# Patient Record
Sex: MALE | Race: WHITE | NOT HISPANIC OR LATINO | Employment: OTHER | URBAN - METROPOLITAN AREA
[De-identification: names, ages, dates, MRNs, and addresses within clinical notes are randomized per-mention and may not be internally consistent; named-entity substitution may affect disease eponyms.]

---

## 2021-10-28 ENCOUNTER — HOSPITAL ENCOUNTER (EMERGENCY)
Facility: HOSPITAL | Age: 51
Discharge: HOME | End: 2021-10-28
Attending: EMERGENCY MEDICINE | Admitting: EMERGENCY MEDICINE
Payer: COMMERCIAL

## 2021-10-28 ENCOUNTER — APPOINTMENT (OUTPATIENT)
Dept: CT IMAGING | Facility: HOSPITAL | Age: 51
End: 2021-10-28
Attending: EMERGENCY MEDICINE
Payer: COMMERCIAL

## 2021-10-28 ENCOUNTER — NURSE TRIAGE (OUTPATIENT)
Dept: NURSING | Facility: CLINIC | Age: 51
End: 2021-10-28

## 2021-10-28 ENCOUNTER — APPOINTMENT (OUTPATIENT)
Dept: RADIOLOGY | Facility: HOSPITAL | Age: 51
End: 2021-10-28
Attending: STUDENT IN AN ORGANIZED HEALTH CARE EDUCATION/TRAINING PROGRAM
Payer: COMMERCIAL

## 2021-10-28 VITALS
WEIGHT: 185 LBS | BODY MASS INDEX: 28.04 KG/M2 | DIASTOLIC BLOOD PRESSURE: 81 MMHG | HEART RATE: 93 BPM | SYSTOLIC BLOOD PRESSURE: 125 MMHG | OXYGEN SATURATION: 92 % | HEIGHT: 68 IN | RESPIRATION RATE: 22 BRPM | TEMPERATURE: 99.4 F

## 2021-10-28 DIAGNOSIS — U07.1 INFECTION DUE TO 2019 NOVEL CORONAVIRUS: ICD-10-CM

## 2021-10-28 PROBLEM — J30.2 SEASONAL ALLERGIC RHINITIS: Status: ACTIVE | Noted: 2021-10-28

## 2021-10-28 LAB
ALBUMIN SERPL-MCNC: 3.5 G/DL (ref 3.5–5)
ALP SERPL-CCNC: 43 U/L (ref 45–120)
ALT SERPL W P-5'-P-CCNC: 52 U/L (ref 0–45)
ANION GAP SERPL CALCULATED.3IONS-SCNC: 12 MMOL/L (ref 5–18)
AST SERPL W P-5'-P-CCNC: 43 U/L (ref 0–40)
BASOPHILS # BLD AUTO: 0 10E3/UL (ref 0–0.2)
BASOPHILS NFR BLD AUTO: 0 %
BILIRUB DIRECT SERPL-MCNC: 0.3 MG/DL
BILIRUB SERPL-MCNC: 0.6 MG/DL (ref 0–1)
BUN SERPL-MCNC: 12 MG/DL (ref 8–22)
C REACTIVE PROTEIN LHE: 4 MG/DL (ref 0–0.8)
CALCIUM SERPL-MCNC: 8.4 MG/DL (ref 8.5–10.5)
CHLORIDE BLD-SCNC: 105 MMOL/L (ref 98–107)
CO2 SERPL-SCNC: 19 MMOL/L (ref 22–31)
CREAT SERPL-MCNC: 0.92 MG/DL (ref 0.7–1.3)
D DIMER PPP FEU-MCNC: 0.43 UG/ML FEU (ref 0–0.5)
EOSINOPHIL # BLD AUTO: 0 10E3/UL (ref 0–0.7)
EOSINOPHIL NFR BLD AUTO: 0 %
ERYTHROCYTE [DISTWIDTH] IN BLOOD BY AUTOMATED COUNT: 12.2 % (ref 10–15)
FLUAV RNA SPEC QL NAA+PROBE: NEGATIVE
FLUBV RNA RESP QL NAA+PROBE: NEGATIVE
GFR SERPL CREATININE-BSD FRML MDRD: >90 ML/MIN/1.73M2
GLUCOSE BLD-MCNC: 99 MG/DL (ref 70–125)
HCT VFR BLD AUTO: 42.4 % (ref 40–53)
HGB BLD-MCNC: 14.8 G/DL (ref 13.3–17.7)
IMM GRANULOCYTES # BLD: 0 10E3/UL
IMM GRANULOCYTES NFR BLD: 0 %
LYMPHOCYTES # BLD AUTO: 0.9 10E3/UL (ref 0.8–5.3)
LYMPHOCYTES NFR BLD AUTO: 17 %
MCH RBC QN AUTO: 30 PG (ref 26.5–33)
MCHC RBC AUTO-ENTMCNC: 34.9 G/DL (ref 31.5–36.5)
MCV RBC AUTO: 86 FL (ref 78–100)
MONOCYTES # BLD AUTO: 0.5 10E3/UL (ref 0–1.3)
MONOCYTES NFR BLD AUTO: 10 %
NEUTROPHILS # BLD AUTO: 3.9 10E3/UL (ref 1.6–8.3)
NEUTROPHILS NFR BLD AUTO: 73 %
NRBC # BLD AUTO: 0 10E3/UL
NRBC BLD AUTO-RTO: 0 /100
PLATELET # BLD AUTO: 160 10E3/UL (ref 150–450)
POTASSIUM BLD-SCNC: 3.8 MMOL/L (ref 3.5–5)
PROT SERPL-MCNC: 7.2 G/DL (ref 6–8)
RBC # BLD AUTO: 4.94 10E6/UL (ref 4.4–5.9)
RSV RNA SPEC NAA+PROBE: NEGATIVE
SARS-COV-2 RNA RESP QL NAA+PROBE: POSITIVE
SODIUM SERPL-SCNC: 136 MMOL/L (ref 136–145)
WBC # BLD AUTO: 5.3 10E3/UL (ref 4–11)

## 2021-10-28 PROCEDURE — 86141 C-REACTIVE PROTEIN HS: CPT | Performed by: EMERGENCY MEDICINE

## 2021-10-28 PROCEDURE — 82310 ASSAY OF CALCIUM: CPT | Performed by: EMERGENCY MEDICINE

## 2021-10-28 PROCEDURE — 71046 X-RAY EXAM CHEST 2 VIEWS: CPT

## 2021-10-28 PROCEDURE — 93005 ELECTROCARDIOGRAM TRACING: CPT | Performed by: EMERGENCY MEDICINE

## 2021-10-28 PROCEDURE — 36415 COLL VENOUS BLD VENIPUNCTURE: CPT | Performed by: EMERGENCY MEDICINE

## 2021-10-28 PROCEDURE — 250N000011 HC RX IP 250 OP 636: Performed by: EMERGENCY MEDICINE

## 2021-10-28 PROCEDURE — 80053 COMPREHEN METABOLIC PANEL: CPT | Performed by: EMERGENCY MEDICINE

## 2021-10-28 PROCEDURE — 71275 CT ANGIOGRAPHY CHEST: CPT

## 2021-10-28 PROCEDURE — 250N000012 HC RX MED GY IP 250 OP 636 PS 637: Performed by: EMERGENCY MEDICINE

## 2021-10-28 PROCEDURE — C9803 HOPD COVID-19 SPEC COLLECT: HCPCS

## 2021-10-28 PROCEDURE — 99285 EMERGENCY DEPT VISIT HI MDM: CPT | Mod: 25

## 2021-10-28 PROCEDURE — 85379 FIBRIN DEGRADATION QUANT: CPT | Performed by: EMERGENCY MEDICINE

## 2021-10-28 PROCEDURE — 85025 COMPLETE CBC W/AUTO DIFF WBC: CPT | Performed by: EMERGENCY MEDICINE

## 2021-10-28 PROCEDURE — 250N000013 HC RX MED GY IP 250 OP 250 PS 637: Performed by: STUDENT IN AN ORGANIZED HEALTH CARE EDUCATION/TRAINING PROGRAM

## 2021-10-28 PROCEDURE — 87637 SARSCOV2&INF A&B&RSV AMP PRB: CPT | Performed by: EMERGENCY MEDICINE

## 2021-10-28 PROCEDURE — 82248 BILIRUBIN DIRECT: CPT | Performed by: EMERGENCY MEDICINE

## 2021-10-28 RX ORDER — DEXAMETHASONE 6 MG/1
6 TABLET ORAL DAILY
Qty: 6 TABLET | Refills: 0 | Status: SHIPPED | OUTPATIENT
Start: 2021-10-29 | End: 2021-11-04

## 2021-10-28 RX ORDER — ACETAMINOPHEN 325 MG/1
650 TABLET ORAL ONCE
Status: COMPLETED | OUTPATIENT
Start: 2021-10-28 | End: 2021-10-28

## 2021-10-28 RX ORDER — IOPAMIDOL 755 MG/ML
100 INJECTION, SOLUTION INTRAVASCULAR ONCE
Status: COMPLETED | OUTPATIENT
Start: 2021-10-28 | End: 2021-10-28

## 2021-10-28 RX ADMIN — IOPAMIDOL 100 ML: 755 INJECTION, SOLUTION INTRAVENOUS at 20:56

## 2021-10-28 RX ADMIN — DEXAMETHASONE 6 MG: 4 TABLET ORAL at 19:28

## 2021-10-28 RX ADMIN — ACETAMINOPHEN 650 MG: 325 TABLET ORAL at 16:05

## 2021-10-28 ASSESSMENT — ENCOUNTER SYMPTOMS
SHORTNESS OF BREATH: 1
FATIGUE: 1
FEVER: 1
MYALGIAS: 1
COUGH: 1

## 2021-10-28 ASSESSMENT — MIFFLIN-ST. JEOR: SCORE: 1673.65

## 2021-10-28 NOTE — ED TRIAGE NOTES
Pt here with fever and cough for the last week. Was seen at the clinic and refused a covid test at that time. He is not vaccinated, + kids at home with covid. He is anxious about having the mask on. Cough is sometimes productive. On last day of antibiotic now.

## 2021-10-28 NOTE — Clinical Note
Jamil Simms was seen and treated in our emergency department on 10/28/2021.  He may return to work on 11/07/2021.  Follow cdc guidelines/work guidelines for covid tested positive today.     If you have any questions or concerns, please don't hesitate to call.      Carmela Devine MD

## 2021-10-28 NOTE — TELEPHONE ENCOUNTER
Triage call:  Patient calling with shortness of breath, cough, fever 102 orally, fatigue, body aches.  He states he is ' short of breath with activity and at rest' and 9/10 chest pain when coughing.   He was seen on 10/25/2021 at [facility] and was prescribed Z-tami.  He reports he is on day 4 today and symptoms not improving.  He refused Covid test at that visit and reports he has had possible exposure from his children.      According to the protocol, patient should Go to ED now.  Care advice given. Patient verbalizes understanding and agrees with plan of care. Plans to call wife to drive him to ED.     Anayeli Traore RN, Nurse Advisor 2:11 PM 10/28/2021    COVID 19 Nurse Triage Plan/Patient Instructions    Please be aware that novel coronavirus (COVID-19) may be circulating in the community. If you develop symptoms such as fever, cough, or SOB or if you have concerns about the presence of another infection including coronavirus (COVID-19), please contact your health care provider or visit [email or web address].     Disposition/Instructions    ED Visit recommended. Follow protocol based instructions.     Bring Your Own Device:  Please also bring your smart device(s) (smart phones, tablets, laptops) and their charging cables for your personal use and to communicate with your care team during your visit.    Thank you for taking steps to prevent the spread of this virus.  o Limit your contact with others.  o Wear a simple mask to cover your cough.  o Wash your hands well and often.    Resources    [facility]: About COVID-19: [email or web address]    CDC: What to Do If You're Sick: www.cdc.gov/coronavirus/2019-ncov/about/steps-when-sick.html    CDC: Ending Home Isolation: www.cdc.gov/coronavirus/2019-ncov/hcp/disposition-in-home-patients.html     CDC: Caring for Someone: www.cdc.gov/coronavirus/2019-ncov/if-you-are-sick/care-for-someone.html     [facility]: Interim Guidance for Hospital Discharge to  Home: www.health.[facility]/diseases/coronavirus/hcp/hospdischarge.pdf    [facility] clinical trials (COVID-19 research studies): [email or web address]     Below are the COVID-19 hotlines at the [place] Department of Health ([facility]). Interpreters are available.   o For health questions: Call 868-699-8420 or 1-956.963.8775 (7 a.m. to 7 p.m.)  o For questions about schools and childcare: Call 243-864-0386 or 1-363.518.4989 (7 a.m. to 7 p.m.)          Reason for Disposition    MODERATE difficulty breathing (e.g., speaks in phrases, SOB even at rest, pulse 100-120)    Additional Information    Negative: SEVERE difficulty breathing (e.g., struggling for each breath, speaks in single words)    Negative: Difficult to awaken or acting confused (e.g., disoriented, slurred speech)    Negative: Bluish (or gray) lips or face now    Negative: Shock suspected (e.g., cold/pale/clammy skin, too weak to stand, low BP, rapid pulse)    Negative: Sounds like a life-threatening emergency to the triager    Negative: [1] COVID-19 exposure AND [2] no symptoms    Negative: COVID-19 vaccine reaction suspected (e.g., fever, headache, muscle aches) occurring 1 to 3 days after getting vaccine    Negative: COVID-19 vaccine, questions about    Negative: [1] Lives with someone known to have influenza (flu test positive) AND [2] flu-like symptoms (e.g., cough, runny nose, sore throat, SOB; with or without fever)    Negative: [1] Adult with possible COVID-19 symptoms AND [2] triager concerned about severity of symptoms or other causes    Negative: COVID-19 and breastfeeding, questions about    Negative: SEVERE or constant chest pain or pressure (Exception: mild central chest pain, present only when coughing)    Protocols used: CORONAVIRUS (COVID-19) DIAGNOSED OR [id number] 8.25.2021

## 2021-10-28 NOTE — ED PROVIDER NOTES
Emergency Department Encounter     Evaluation Date & Time:   10/28/2021  6:26 PM    CHIEF COMPLAINT:  Cough and Fever      Triage Note:Pt here with fever and cough for the last week. Was seen at the clinic and refused a covid test at that time. He is not vaccinated, + kids at home with covid. He is anxious about having the mask on. Cough is sometimes productive. On last day of antibiotic now.         Impression and Plan     ED COURSE & MEDICAL DECISION MAKIN:35 PM I met with the patient, obtained history, performed an initial exam, and discussed options and plan for diagnostics and treatment here in the ED.       ED Course as of Oct 28 2152   Thu Oct 28, 2021   1902 Patient has symptoms and cxr c/w covid pneumonia.  We discussed why azithromycin wouldn't be effective, but he should finish to avoid antibiotic resistance.  Otherwise, here I had him up and moving around the room getting changed for a good few minutes and his lowest oxygen saturation was 95%.  Initially in triage she was 93%.  He is not in any respiratory distress.  He is not on immunosuppressive's for his psoriasis.  He is appropriate for discharge continuing to manage at home if his CT scan shows no evidence for PE.  Since he felt decompensated today we will check that but his D-dimer is otherwise normal.  Additionally, I do think putting him on a course of Decadron would be helpful as when he coughs it does have more of a bronchitis type of sound.  He has albuterol available to him if he needs/wants to try it but he does not like how it makes him feel so tends to avoid it and instead we discussed using humidified air which he is doing at home.  We also discussed the use of an oxygen monitor at home and he will decide whether or not to purchase one but really what I advises that is if he is feeling that his breathing is worse to the point where he is feeling at risk at home or if he is weak to the point he is feeling like he should not be at  home he simply should return for repeat evaluation and he is comfortable with that plan.  If CT is negative we will set up with get well loop referral for at home.  Minor elevations in LFTs are consistent with viral etiology of illness.      2151 Patient remains with good oxygen level here, CT scan is negative for PE.  He is discharged home with Decadron.  He will finish his current course of azithromycin since he only has 1 day left, and does feel comfortable continuing over-the-counter regimens as needed for cough and cold.  He is currently  from his family and they are following guidelines for Covid in their household as well.          At the conclusion of the encounter I discussed the results of all the tests and the disposition. The questions were answered. The patient or family acknowledged understanding and was agreeable with the care plan.        FINAL IMPRESSION:    ICD-10-CM    1. Infection due to 2019 novel coronavirus  U07.1 COVID-19 GetWell Loop Referral       0 minutes of critical care time        MEDICATIONS GIVEN IN THE EMERGENCY DEPARTMENT:  Medications   acetaminophen (TYLENOL) tablet 650 mg (650 mg Oral Given 10/28/21 1605)   dexamethasone (DECADRON) tablet 6 mg (6 mg Oral Given 10/28/21 1928)   iopamidol (ISOVUE-370) solution 100 mL (100 mLs Intravenous Given 10/28/21 2056)       NEW PRESCRIPTIONS STARTED AT TODAY'S ED VISIT:  New Prescriptions    DEXAMETHASONE (DECADRON) 6 MG TABLET    Take 1 tablet (6 mg) by mouth daily for 6 days       HPI     The history is provided by the patient. No  was used.        Jamil Simms is a 51 year old male with a pertinent history of seasonal allergies and psoarisis who presents to this ED by walk in for evaluation of fever and cough.     Starting on 10/20/2021 (8 days ago), patient has experienced cough, fever, and body aches. His fever resolved on 10/25/2021 (3 days ago). Patient has an albuterol inhaler to use when he gets a  "cold, however, he generally does not like to use it because he doesn't enjoy how it makes him feel. Reports that he used it for his current symptoms but that it did not help. Patient took Robutussin without relief and Ibuprofen and Tylenol for the body aches with relief. Today, patient took over the counter medications and his last dose of Erythromycin which was prescribed by a clinic. Patient presented today for increased fatigue and breathlessness.     REVIEW OF SYSTEMS:  Review of Systems   Constitutional: Positive for fatigue and fever (resolved).   Respiratory: Positive for cough and shortness of breath.    Musculoskeletal: Positive for myalgias.     remainder of systems are all otherwise negative.        Medical History     History reviewed. No pertinent past medical history.    History reviewed. No pertinent surgical history.    History reviewed. No pertinent family history.    Social History     Tobacco Use     Smoking status: None   Substance Use Topics     Alcohol use: None     Drug use: None       [START ON 10/29/2021] dexamethasone (DECADRON) 6 MG tablet        Physical Exam     First Vitals:  Patient Vitals for the past 24 hrs:   BP Temp Temp src Pulse Resp SpO2 Height Weight   10/28/21 1915 -- -- -- 93 -- 92 % -- --   10/28/21 1900 125/81 -- -- 99 -- 94 % -- --   10/28/21 1845 132/81 -- -- 95 22 94 % -- --   10/28/21 1728 -- 99.4  F (37.4  C) Temporal 98 -- -- -- --   10/28/21 1548 127/77 (!) 100.8  F (38.2  C) Temporal 118 24 93 % 1.727 m (5' 8\") 83.9 kg (185 lb)       PHYSICAL EXAM:   Constitutional:   In nad walking around room getting changed   HENT:  Normocephalic, wearing a mask, normal voice   Eyes:  PERRL, EOMI, Conjunctiva normal, No discharge, no scleral icterus.  Respiratory:  Breathing easily, rhonchi when coughs, otherwise only slight crackles in left lung base and remainder is cta.  No increased rr or accessory muscle usage.  Cardiovascular:  rrr nl s1s2 0 murmurs, rubs, or gallops.  " Peripheral pulses dp, pt, and radial are wnl.  no peripheral edema   GI:  Bowel sounds normal, Soft, No tenderness, No flank tenderness, nondistended.  :No CVA tenderness.   Musculoskeletal:  Moves all extremities.  No erythematous or swollen major joints,   Integument:  Normal color, rash forehead erythematous with dry plaques.  Lymphatic:  No cervical lymphadenopathy  Neurologic:  Alert & oriented x 3, Normal motor function, Normal sensory function, No focal deficits noted. Normal speech.  Psychiatric:  Affect normal, Judgment normal, Mood normal.     Results     LAB:  All pertinent labs reviewed and interpreted  Results for orders placed or performed during the hospital encounter of 10/28/21   Chest XR,  PA & LAT     Status: None    Narrative    EXAM: XR CHEST 2 VW  LOCATION: [facility]  DATE/TIME: 10/28/2021 4:48 PM    INDICATION: Shortness of breath, cough  COMPARISON: None.      Impression    IMPRESSION:     There are multifocal indistinct bilateral airspace opacities with a mid and lower lung zone predominance. Findings are consistent with infection/inflammation and would be consistent with COVID 19 pneumonia.    Normal heart and mediastinal contours.    No pleural space abnormality.   CT Chest Pulmonary Embolism w Contrast     Status: None    Narrative    EXAM: CT CHEST PULMONARY EMBOLISM W CONTRAST  LOCATION: [facility]  DATE/TIME: 10/28/2021 8:41 PM    INDICATION: PE suspected, low/intermediate prob, neg D-dimer  COMPARISON: None.  TECHNIQUE: CT chest pulmonary angiogram during arterial phase injection of IV contrast. Multiplanar reformats and MIP reconstructions were performed. Dose reduction techniques were used.   CONTRAST: isovue 370 100ml    FINDINGS:  ANGIOGRAM CHEST: No evidence for pulmonary embolism. Pulmonary arteries normal in caliber. Thoracic aorta normal in caliber. No aortic dissection or other acute abnormality.    HEART: Cardiac chambers  within normal limits. No pericardial effusion. No coronary artery calcification.    MEDIASTINUM: Borderline enlarged mediastinal and right hilar lymph nodes. No mass.    LUNGS AND PLEURA: Moderate patchy lobular and subsegmental ground glass density throughout the lungs, greatest in the mid lung zones. No pleural effusion or pneumothorax.    LIMITED UPPER ABDOMEN: Negative.    MUSCULOSKELETAL: Negative.      Impression    IMPRESSION:  1.  No evidence for pulmonary embolism.   2.  Moderate multifocal pneumonia compatible with COVID 19 pneumonia.   Symptomatic Influenza A/B & SARS-CoV2 (COVID-19) Virus PCR Multiplex Nasopharyngeal     Status: Abnormal    Specimen: Nasopharyngeal; Swab   Result Value Ref Range    Influenza A target Negative Negative    Influenza B target Negative Negative    RSV target Negative Negative    SARS CoV2 PCR Positive (A) Negative    Narrative    Testing was performed using the Xpert Xpress CoV2/Flu/RSV Assay on the Cepheid GeneXpert Instrument. This test should be ordered for the detection of SARS-CoV-2 and influenza viruses in individuals who meet clinical and/or epidemiological criteria. Test performance is unknown in asymptomatic patients. This test is for in vitro diagnostic use under the FDA EUA for laboratories certified under CLIA to perform high or moderate complexity testing. This test has not been FDA cleared or approved. A negative result does not rule out the presence of PCR inhibitors in the specimen or target RNA in concentration below the limit of detection for the assay. If only one viral target is positive but coinfection with multiple targets is suspected, the sample should be re-tested with another FDA cleared, approved, or authorized test, if coinfection would change clinical management. This test was validated by the [facility]. These laboratories are certified under the Clinical  Laboratory Improvement Amendments of 1988 (CLIA-88) as qualified to  perform high complexity laboratory testing.   Basic metabolic panel     Status: Abnormal   Result Value Ref Range    Sodium 136 136 - 145 mmol/L    Potassium 3.8 3.5 - 5.0 mmol/L    Chloride 105 98 - 107 mmol/L    Carbon Dioxide (CO2) 19 (L) 22 - 31 mmol/L    Anion Gap 12 5 - 18 mmol/L    Urea Nitrogen 12 8 - 22 mg/dL    Creatinine 0.92 0.70 - 1.30 mg/dL    Calcium 8.4 (L) 8.5 - 10.5 mg/dL    Glucose 99 70 - 125 mg/dL    GFR Estimate >90 >60 mL/min/1.73m2   D dimer quantitative     Status: Normal   Result Value Ref Range    D-Dimer Quantitative 0.43 0.00 - 0.50 ug/mL FEU    Narrative    This D-dimer assay is intended for use in conjunction with a clinical pretest probability assessment model to exclude pulmonary embolism (PE) and deep venous thrombosis (DVT) in outpatients suspected of PE or DVT. The cut-off value is 0.50 ug/mL FEU.   CRP inflammation     Status: Abnormal   Result Value Ref Range    CRP 4.0 (H) 0.0-<0.8 mg/dL   Hepatic function panel     Status: Abnormal   Result Value Ref Range    Bilirubin Total 0.6 0.0 - 1.0 mg/dL    Bilirubin Direct 0.3 <=0.5 mg/dL    Protein Total 7.2 6.0 - 8.0 g/dL    Albumin 3.5 3.5 - 5.0 g/dL    Alkaline Phosphatase 43 (L) 45 - 120 U/L    AST 43 (H) 0 - 40 U/L    ALT 52 (H) 0 - 45 U/L   CBC with platelets and differential     Status: None   Result Value Ref Range    WBC Count 5.3 4.0 - 11.0 10e3/uL    RBC Count 4.94 4.40 - 5.90 10e6/uL    Hemoglobin 14.8 13.3 - 17.7 g/dL    Hematocrit 42.4 40.0 - 53.0 %    MCV 86 78 - 100 fL    MCH 30.0 26.5 - 33.0 pg    MCHC 34.9 31.5 - 36.5 g/dL    RDW 12.2 10.0 - 15.0 %    Platelet Count 160 150 - 450 10e3/uL    % Neutrophils 73 %    % Lymphocytes 17 %    % Monocytes 10 %    % Eosinophils 0 %    % Basophils 0 %    % Immature Granulocytes 0 %    NRBCs per 100 WBC 0 <1 /100    Absolute Neutrophils 3.9 1.6 - 8.3 10e3/uL    Absolute Lymphocytes 0.9 0.8 - 5.3 10e3/uL    Absolute Monocytes 0.5 0.0 - 1.3 10e3/uL    Absolute Eosinophils 0.0 0.0 -  0.7 10e3/uL    Absolute Basophils 0.0 0.0 - 0.2 10e3/uL    Absolute Immature Granulocytes 0.0 <=0.0 10e3/uL    Absolute NRBCs 0.0 10e3/uL   CBC with platelets + differential     Status: None    Narrative    The following orders were created for panel order CBC with platelets + differential.  Procedure                               Abnormality         Status                     ---------                               -----------         ------                     CBC with platelets and d...[[id number]]                      Final result                 Please view results for these tests on the individual orders.       RADIOLOGY:  Chest XR,  PA & LAT    Result Date: 10/28/2021  EXAM: XR CHEST 2 VW LOCATION: [facility] [place] [facility] DATE/TIME: 10/28/2021 4:48 PM INDICATION: Shortness of breath, cough COMPARISON: None.     IMPRESSION: There are multifocal indistinct bilateral airspace opacities with a mid and lower lung zone predominance. Findings are consistent with infection/inflammation and would be consistent with COVID 19 pneumonia. Normal heart and mediastinal contours. No pleural space abnormality.      ECG:    Performed at: 1844    Impression: nsrrate of 94, pr 152ms, qrs 82ms, qtc 422ms, prt axes 46 43 17, no acute st changes.    No previous available for comparison.    I have independently reviewed and interpreted the EKS(s) documented above           The creation of this record is based on the scribe s observations of the work being performed by Carmela Devine MD, and the provider s statements to them. This document has been checked and approved by MD Carmela Goddard MD  Emergency Medicine  [facility] EMERGENCY DEPARTMENT       Carmela Devine MD  10/28/21 4217

## 2021-10-29 LAB
ATRIAL RATE - MUSE: 94 BPM
DIASTOLIC BLOOD PRESSURE - MUSE: 86 MMHG
INTERPRETATION ECG - MUSE: NORMAL
P AXIS - MUSE: 46 DEGREES
PR INTERVAL - MUSE: 152 MS
QRS DURATION - MUSE: 82 MS
QT - MUSE: 338 MS
QTC - MUSE: 422 MS
R AXIS - MUSE: 43 DEGREES
SYSTOLIC BLOOD PRESSURE - MUSE: 136 MMHG
T AXIS - MUSE: 17 DEGREES
VENTRICULAR RATE- MUSE: 94 BPM

## 2021-10-29 NOTE — ED NOTES
Nursing assessment---    Pt has had flu symptoms for 8 days, body aches, fever, some SOB with activity.  Covid test still pending but Xray is consistent with covid.  Pt is not vaccinated. Able to speak full sentences.  Oxygenation on room air is stable.

## 2021-10-29 NOTE — DISCHARGE INSTRUCTIONS
Return to the ER as you need if you feel your breathing is too bad to be at home or too weak for repeat evaluation.    Your next dose of decadron can be anytime tomorrow but try and take it with food to avoid stomach upset.    Keep well hydrated.